# Patient Record
Sex: FEMALE | Race: BLACK OR AFRICAN AMERICAN | NOT HISPANIC OR LATINO | URBAN - METROPOLITAN AREA
[De-identification: names, ages, dates, MRNs, and addresses within clinical notes are randomized per-mention and may not be internally consistent; named-entity substitution may affect disease eponyms.]

---

## 2018-06-24 ENCOUNTER — EMERGENCY (EMERGENCY)
Facility: HOSPITAL | Age: 33
LOS: 1 days | Discharge: HOME | End: 2018-06-24
Admitting: EMERGENCY MEDICINE
Payer: SELF-PAY

## 2018-06-24 VITALS
HEART RATE: 96 BPM | DIASTOLIC BLOOD PRESSURE: 86 MMHG | RESPIRATION RATE: 20 BRPM | OXYGEN SATURATION: 100 % | SYSTOLIC BLOOD PRESSURE: 118 MMHG | TEMPERATURE: 97 F

## 2018-06-24 DIAGNOSIS — R41.82 ALTERED MENTAL STATUS, UNSPECIFIED: ICD-10-CM

## 2018-06-24 DIAGNOSIS — F10.129 ALCOHOL ABUSE WITH INTOXICATION, UNSPECIFIED: ICD-10-CM

## 2018-06-24 DIAGNOSIS — E11.9 TYPE 2 DIABETES MELLITUS WITHOUT COMPLICATIONS: ICD-10-CM

## 2018-06-24 DIAGNOSIS — R11.2 NAUSEA WITH VOMITING, UNSPECIFIED: ICD-10-CM

## 2018-06-24 PROCEDURE — 99284 EMERGENCY DEPT VISIT MOD MDM: CPT

## 2018-06-24 NOTE — ED PROVIDER NOTE - OBJECTIVE STATEMENT
34 yo F with PMHx of DM, presenting c/o 32 yo F with PMHx of DM, BIBA for N/V and public intoxication. Pt was at the [employer] and had multiple rounds of alcoholic drinks. Now with N/V.  Denies trauma, fall, HA, dizziness, bleeding, D/C, CP, SOB, palpitations, tremors, change in urinary/bowel function, and abdominal pain. No illicit drug use noted.

## 2018-06-24 NOTE — ED PROVIDER NOTE - PHYSICAL EXAMINATION
Gen - WDWN F, +AOB, lethargic but arousable by verbal stimuli  Skin - warm, dry, intact  HEENT - AT/NC, PERRL, mild conjunctival injection, o/p clear, uvula midline, airway patent, neck supple with no step off   CV - S1S2, R/R/R  Resp - respiration non-labored, CTAB, symmetric bs b/l, no r/r/w  GI - NABS, soft, ND, NT, no rebound or guarding, no CVAT b/l  MS - moving all extremities, no c/c/e   Neuro - Lethargic but arousable by verbal stimuli, slurred speech and unsteady gait

## 2018-06-24 NOTE — ED ADULT NURSE REASSESSMENT NOTE - NS ED NURSE REASSESS COMMENT FT1
Pt was OOB to bathroom with steady gait. however, when returning from bathroom pt complaining about Pt was OOB to bathroom with steady gait. however, when returning from bathroom pt complaining about dizziness. Discharge postponed, PA notified.

## 2025-06-24 NOTE — ED ADULT NURSE NOTE - TEMPLATE LIST FOR HEAD TO TOE ASSESSMENT
Orders:    Follow Up In Primary Care    DULoxetine (Cymbalta) 60 mg DR capsule; Take 1 capsule (60 mg) by mouth once daily.    Follow Up In Primary Care; Future     Neuro